# Patient Record
Sex: FEMALE | Race: WHITE | URBAN - METROPOLITAN AREA
[De-identification: names, ages, dates, MRNs, and addresses within clinical notes are randomized per-mention and may not be internally consistent; named-entity substitution may affect disease eponyms.]

---

## 2017-11-21 ENCOUNTER — HISTORICAL (OUTPATIENT)
Dept: PREADMISSION TESTING | Facility: HOSPITAL | Age: 47
End: 2017-11-21

## 2017-11-21 LAB
ABS NEUT (OLG): 4.26 X10(3)/MCL (ref 2.1–9.2)
BASOPHILS # BLD AUTO: 0 X10(3)/MCL (ref 0–0.2)
BASOPHILS NFR BLD AUTO: 1 %
BUN SERPL-MCNC: 14 MG/DL (ref 7–18)
CALCIUM SERPL-MCNC: 9.4 MG/DL (ref 8.5–10.1)
CHLORIDE SERPL-SCNC: 105 MMOL/L (ref 98–107)
CO2 SERPL-SCNC: 28 MMOL/L (ref 21–32)
CREAT SERPL-MCNC: 0.76 MG/DL (ref 0.55–1.02)
EOSINOPHIL # BLD AUTO: 0.3 X10(3)/MCL (ref 0–0.9)
EOSINOPHIL NFR BLD AUTO: 4 %
ERYTHROCYTE [DISTWIDTH] IN BLOOD BY AUTOMATED COUNT: 13.7 % (ref 11.5–17)
EST. AVERAGE GLUCOSE BLD GHB EST-MCNC: 183 MG/DL
GLUCOSE SERPL-MCNC: 44 MG/DL (ref 74–106)
HBA1C MFR BLD: 8 % (ref 4.2–6.3)
HCT VFR BLD AUTO: 43.2 % (ref 37–47)
HGB BLD-MCNC: 13.7 GM/DL (ref 12–16)
LYMPHOCYTES # BLD AUTO: 2.8 X10(3)/MCL (ref 0.6–4.6)
LYMPHOCYTES NFR BLD AUTO: 34 %
MCH RBC QN AUTO: 30.1 PG (ref 27–31)
MCHC RBC AUTO-ENTMCNC: 31.7 GM/DL (ref 33–36)
MCV RBC AUTO: 94.9 FL (ref 80–94)
MONOCYTES # BLD AUTO: 0.6 X10(3)/MCL (ref 0.1–1.3)
MONOCYTES NFR BLD AUTO: 7 %
MRSA SCREEN BY PCR: NEGATIVE
NEUTROPHILS # BLD AUTO: 4.26 X10(3)/MCL (ref 1.4–7.9)
NEUTROPHILS NFR BLD AUTO: 53 %
PLATELET # BLD AUTO: 224 X10(3)/MCL (ref 130–400)
PMV BLD AUTO: 11.2 FL (ref 9.4–12.4)
POTASSIUM SERPL-SCNC: 4.7 MMOL/L (ref 3.5–5.1)
RBC # BLD AUTO: 4.55 X10(6)/MCL (ref 4.2–5.4)
SODIUM SERPL-SCNC: 141 MMOL/L (ref 136–145)
WBC # SPEC AUTO: 8 X10(3)/MCL (ref 4.5–11.5)

## 2017-11-30 ENCOUNTER — HISTORICAL (OUTPATIENT)
Dept: SURGERY | Facility: HOSPITAL | Age: 47
End: 2017-11-30

## 2022-04-11 ENCOUNTER — HISTORICAL (OUTPATIENT)
Dept: ADMINISTRATIVE | Facility: HOSPITAL | Age: 52
End: 2022-04-11

## 2022-04-27 VITALS
OXYGEN SATURATION: 99 % | SYSTOLIC BLOOD PRESSURE: 153 MMHG | WEIGHT: 184.94 LBS | DIASTOLIC BLOOD PRESSURE: 88 MMHG | HEIGHT: 61 IN | BODY MASS INDEX: 34.92 KG/M2

## 2022-04-30 NOTE — OP NOTE
DATE OF SURGERY:    11/30/2017    SURGEON:  FUAD Silva MD  ASSISTANT:  Bradley Chao CST    PREOPERATIVE DIAGNOSIS:  Right carpal tunnel syndrome and right 4th trigger finger.    POSTOPERATIVE DIAGNOSIS:  Right carpal tunnel syndrome and right 4th trigger finger.    PROCEDURE:  Right 4th trigger finger release and a right carpal tunnel release.    INDICATION FOR OPERATION:  This 47-year-old had the above-mentioned problem.  She had failed conservative therapy and desired operative intervention.  The risks and benefits of the proposed and alternative treatment were explained to the patient.  Questions were solicited and answered.  No assurance given.  Informed consent was obtained.    PROCEDURE IN DETAIL:  After appropriate operative consents were obtained the patient was taken to the operating room and placed on the operating table in the supine position.  Axillary block was induced by anesthesia without difficulty.  The skin on the right arm was prepped and draped in a sterile manner using ChloraPrep.  After exsanguination with an Esmarch bandage the previously placed arm tourniquet was elevated.       Attention was turned to the 4th trigger finger first.  Standard approach to the A1 pulley of the 4th trigger finger was done.  Careful dissection was done.  Digital nerves were retracted out of the way and A1 pulley was identified.  A1 pulley was released.  The finger was taken through a range of motion and triggering was resolved.  The wound was thoroughly irrigated.  All bleeders were coagulated and the wound was closed with interrupted nylon sutures.     Attention was then turned to the carpal tunnel.  Standard approach to carpal tunnel was done.  Palmar fascia was identified.  Small hill was made in the palmar fascia and a [place] elevator was slipped beneath the palmar fascia and the transverse carpal ligament.  Palmar fascia was opened.  Transverse carpal ligament was opened.  Extension on  the transverse carpal ligament and the wrist was opened under direct visualization.  The wound was thoroughly irrigated.  All bleeders were coagulated.  The wound was closed with interrupted nylon sutures.  Sterile dressings were applied and a compressive dressing was applied.  The tourniquet was deflated.         Lap count and sponge count were correct x two.  Estimated blood loss: Was minimal.  The patient tolerated the procedure without difficulty and was transferred to one day stay in stable condition.        ______________________________  M. MD PATRICIA Gilliam/JD  DD:  11/30/2017  Time:  08:12AM  DT:  11/30/2017  Time:  02:54PM  Job #:  [id number]